# Patient Record
Sex: FEMALE | Race: WHITE | NOT HISPANIC OR LATINO | URBAN - METROPOLITAN AREA
[De-identification: names, ages, dates, MRNs, and addresses within clinical notes are randomized per-mention and may not be internally consistent; named-entity substitution may affect disease eponyms.]

---

## 2019-07-03 ENCOUNTER — OFFICE VISIT (OUTPATIENT)
Dept: PRIMARY CARE | Facility: CLINIC | Age: 24
End: 2019-07-03
Payer: COMMERCIAL

## 2019-07-03 VITALS
TEMPERATURE: 98.9 F | DIASTOLIC BLOOD PRESSURE: 64 MMHG | BODY MASS INDEX: 17.75 KG/M2 | RESPIRATION RATE: 12 BRPM | HEART RATE: 68 BPM | WEIGHT: 104 LBS | OXYGEN SATURATION: 98 % | SYSTOLIC BLOOD PRESSURE: 102 MMHG | HEIGHT: 64 IN

## 2019-07-03 DIAGNOSIS — Z00.00 PHYSICAL EXAM: Primary | ICD-10-CM

## 2019-07-03 PROCEDURE — 99385 PREV VISIT NEW AGE 18-39: CPT | Performed by: FAMILY MEDICINE

## 2019-07-03 ASSESSMENT — ENCOUNTER SYMPTOMS
BACK PAIN: 0
SLEEP DISTURBANCE: 0
DYSURIA: 0
CHEST TIGHTNESS: 0
BRUISES/BLEEDS EASILY: 0
TROUBLE SWALLOWING: 0
EYE PAIN: 0
TREMORS: 0
PALPITATIONS: 0
COUGH: 0
NECK PAIN: 0
SORE THROAT: 0
APPETITE CHANGE: 0
ARTHRALGIAS: 0
BLOOD IN STOOL: 0
FEVER: 0
FATIGUE: 0
SHORTNESS OF BREATH: 0
NERVOUS/ANXIOUS: 0
POLYPHAGIA: 0
SINUS PRESSURE: 0
SINUS PAIN: 0
VOICE CHANGE: 0
DYSPHORIC MOOD: 0
HEADACHES: 0
ACTIVITY CHANGE: 0
EYE DISCHARGE: 0
WHEEZING: 0
ADENOPATHY: 0
CHILLS: 0
WEAKNESS: 0
CONFUSION: 0
JOINT SWELLING: 0
POLYDIPSIA: 0

## 2019-07-03 NOTE — PROGRESS NOTES
Patient ID: Lakshmi Melendez is a 23 y.o. female.    Subjective     HPIplans  Graduate nurse study   No    Migraine in  1.5 years       Chief Complaint   Patient presents with   • Establish care       There is no problem list on file for this patient.      History reviewed. No pertinent past medical history.    History reviewed. No pertinent surgical history.    Family History   Problem Relation Age of Onset   • Osteoporosis Mother    • Heart attack Maternal Grandmother    • Stroke Maternal Grandmother    • Heart attack Maternal Grandfather    • Skin cancer Paternal Grandfather        Social History     Social History   • Marital status: Single     Spouse name: N/A   • Number of children: N/A   • Years of education: N/A     Social History Main Topics   • Smoking status: Never Smoker   • Smokeless tobacco: Never Used   • Alcohol use Yes      Comment: Socially   • Drug use: No   • Sexual activity: No     Other Topics Concern   • None     Social History Narrative    RN       No current outpatient prescriptions on file.     No current facility-administered medications for this visit.        Alllergies   Patient has no known allergies.      The following have been reviewed and updated as appropriate in this visit:       Review of Systems   Constitutional: Negative for activity change, appetite change, chills, fatigue and fever.   HENT: Negative for congestion, ear pain, hearing loss, postnasal drip, sinus pain, sinus pressure, sore throat, tinnitus, trouble swallowing and voice change.    Eyes: Negative for pain, discharge and visual disturbance.   Respiratory: Negative for cough, chest tightness, shortness of breath and wheezing.    Cardiovascular: Negative for chest pain, palpitations and leg swelling.   Gastrointestinal: Negative for blood in stool.   Endocrine: Negative for cold intolerance, heat intolerance, polydipsia and polyphagia.   Genitourinary: Negative for dysuria.   Musculoskeletal: Negative for  "arthralgias, back pain, joint swelling and neck pain.   Skin: Negative for rash.   Allergic/Immunologic: Negative for environmental allergies, food allergies and immunocompromised state.   Neurological: Negative for tremors, weakness and headaches.   Hematological: Negative for adenopathy. Does not bruise/bleed easily.   Psychiatric/Behavioral: Negative for confusion, dysphoric mood and sleep disturbance. The patient is not nervous/anxious.        Objective     Vitals:    07/03/19 1630   BP: 102/64   BP Location: Right upper arm   Patient Position: Sitting   Pulse: 68   Resp: 12   Temp: 37.2 °C (98.9 °F)   TempSrc: Oral   SpO2: 98%   Weight: 47.2 kg (104 lb)   Height: 1.613 m (5' 3.5\")     Body mass index is 18.13 kg/m².    Physical Exam   Constitutional: She is oriented to person, place, and time. She appears well-developed and well-nourished.   HENT:   Head: Normocephalic and atraumatic.   Right Ear: External ear normal.   Left Ear: External ear normal.   Nose: Nose normal.   Mouth/Throat: Oropharynx is clear and moist.   Eyes: Pupils are equal, round, and reactive to light. Conjunctivae and EOM are normal. No scleral icterus.   Neck: No tracheal deviation present.   Cardiovascular: Normal rate, regular rhythm, normal heart sounds and intact distal pulses.  Exam reveals no gallop and no friction rub.    No murmur heard.  Pulmonary/Chest: Effort normal and breath sounds normal. No respiratory distress. She has no wheezes. She has no rales.   Abdominal: Soft. Bowel sounds are normal. She exhibits no distension and no mass. There is no tenderness. There is no rebound and no guarding. No hernia.   Musculoskeletal: Normal range of motion. She exhibits no edema, tenderness or deformity.   Lymphadenopathy:     She has no cervical adenopathy.   Neurological: She is alert and oriented to person, place, and time. No cranial nerve deficit.   Skin: No rash noted. No erythema.   Psychiatric: She has a normal mood and affect. " Her behavior is normal. Thought content normal.       Assessment/Plan   Problem List Items Addressed This Visit     None      Visit Diagnoses     Physical exam    -  Primary         well exam

## 2019-07-03 NOTE — LETTER
July 3, 2019     Patient: Lakshmi Melendez  YOB: 1995  Date of Visit: 7/3/2019    To Whom it May Concern:    Lakshmi Melendez was seen in my clinic on 7/3/2019 at 4:20 pm.  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    If you have any questions or concerns, please don't hesitate to call.         Sincerely,         Christine Patel MD        CC: No Recipients

## 2019-09-11 ENCOUNTER — HOSPITAL ENCOUNTER (OUTPATIENT)
Facility: CLINIC | Age: 24
Discharge: HOME | End: 2019-09-11
Attending: FAMILY MEDICINE
Payer: COMMERCIAL

## 2019-09-11 VITALS
WEIGHT: 109.2 LBS | BODY MASS INDEX: 18.64 KG/M2 | SYSTOLIC BLOOD PRESSURE: 100 MMHG | TEMPERATURE: 98.4 F | DIASTOLIC BLOOD PRESSURE: 58 MMHG | HEART RATE: 90 BPM | OXYGEN SATURATION: 97 % | HEIGHT: 64 IN

## 2019-09-11 DIAGNOSIS — H10.32 ACUTE CONJUNCTIVITIS OF LEFT EYE, UNSPECIFIED ACUTE CONJUNCTIVITIS TYPE: Primary | ICD-10-CM

## 2019-09-11 PROCEDURE — 99213 OFFICE O/P EST LOW 20 MIN: CPT | Performed by: FAMILY MEDICINE

## 2019-09-11 PROCEDURE — S9083 URGENT CARE CENTER GLOBAL: HCPCS | Performed by: FAMILY MEDICINE

## 2019-09-11 RX ORDER — POLYMYXIN B SULFATE AND TRIMETHOPRIM 1; 10000 MG/ML; [USP'U]/ML
1 SOLUTION OPHTHALMIC
Qty: 10 ML | Refills: 0 | Status: SHIPPED | OUTPATIENT
Start: 2019-09-11 | End: 2019-09-21

## 2019-09-11 ASSESSMENT — ENCOUNTER SYMPTOMS
NUMBNESS: 0
CHILLS: 0
FATIGUE: 0
FEVER: 0
WEAKNESS: 0
DIAPHORESIS: 0

## 2019-09-11 NOTE — ED PROVIDER NOTES
History  Chief Complaint   Patient presents with   • Conjunctivitis     Fourth day of mild redness of the left eye diffusely, and thick yellowish discharge reaccumulating at least in the morning, less so during the day.  No known exposures or trauma.  No change in visual acuity.  No headache.  Suspects pinkeye.  Does not have any allergies or symptoms in the right eye.            No past medical history on file.    No past surgical history on file.    Family History   Problem Relation Age of Onset   • Osteoporosis Mother    • Heart attack Maternal Grandmother    • Stroke Maternal Grandmother    • Heart attack Maternal Grandfather    • Skin cancer Paternal Grandfather        Social History   Substance Use Topics   • Smoking status: Never Smoker   • Smokeless tobacco: Never Used   • Alcohol use Yes      Comment: Socially       Review of Systems   Constitutional: Negative for chills, diaphoresis, fatigue and fever.   Neurological: Negative for weakness and numbness.       Physical Exam  ED Triage Vitals [09/11/19 1808]   Temp Heart Rate Resp BP SpO2   36.9 °C (98.4 °F) 90 -- (!) 100/58 97 %      Temp Source Heart Rate Source Patient Position BP Location FiO2 (%) (Set)   Oral -- Sitting Left upper arm --       Physical Exam   Constitutional: She appears well-developed and well-nourished. No distress.   HENT:   Head: Normocephalic and atraumatic.   Eyes: Pupils are equal, round, and reactive to light. EOM and lids are normal. Lids are everted and swept, no foreign bodies found. Left eye exhibits no chemosis, no discharge (None currently apparent) and no hordeolum. No foreign body present in the left eye. Right conjunctiva is not injected. Right conjunctiva has no hemorrhage. Left conjunctiva is injected (Diffuse, mild). Left conjunctiva has no hemorrhage.   Pulmonary/Chest: Effort normal.   Neurological: She is alert.   Skin: No rash noted. She is not diaphoretic.   Psychiatric: She has a normal mood and affect.          Procedures  Procedures    UC Course  Clinical Impressions as of Sep 11 1822   Acute conjunctivitis of left eye, unspecified acute conjunctivitis type       MDM  Number of Diagnoses or Management Options  Acute conjunctivitis of left eye, unspecified acute conjunctivitis type:   Diagnosis management comments: Acute conjunctivitis of left eye, unspecified acute conjunctivitis type  (primary encounter diagnosis)  Polytrim.                  Travon Lay MD  09/11/19 1824

## 2019-09-11 NOTE — DISCHARGE INSTRUCTIONS
Use the eye antibiotic prescribed.    Practice good hand hygiene--  avoid touching affected eye.   No use of contact lenses until symptoms completely resolve.  If at any time symptoms worsen (e.g. upper eyelid also swollen, change in vision, fever, pain with eye movement, etc.), go to ER or an Eye Urgent Care (e.g. [facility]--- [address] [place] [id number]--  [address] from the [facility] main entrance; Phone: [phone]).

## 2020-01-09 ENCOUNTER — TELEPHONE (OUTPATIENT)
Dept: PRIMARY CARE | Facility: CLINIC | Age: 25
End: 2020-01-09

## 2020-03-09 ENCOUNTER — OFFICE VISIT (OUTPATIENT)
Dept: PRIMARY CARE | Facility: CLINIC | Age: 25
End: 2020-03-09
Payer: COMMERCIAL

## 2020-03-09 VITALS
WEIGHT: 113.8 LBS | SYSTOLIC BLOOD PRESSURE: 100 MMHG | DIASTOLIC BLOOD PRESSURE: 70 MMHG | HEART RATE: 81 BPM | BODY MASS INDEX: 18.96 KG/M2 | OXYGEN SATURATION: 96 % | TEMPERATURE: 97.9 F | HEIGHT: 65 IN

## 2020-03-09 DIAGNOSIS — Z00.00 PHYSICAL EXAM: Primary | ICD-10-CM

## 2020-03-09 PROCEDURE — 99395 PREV VISIT EST AGE 18-39: CPT | Performed by: FAMILY MEDICINE

## 2020-03-09 ASSESSMENT — ENCOUNTER SYMPTOMS
JOINT SWELLING: 0
COUGH: 0
ABDOMINAL PAIN: 0
WHEEZING: 0
POLYDIPSIA: 0
AGITATION: 0
UNEXPECTED WEIGHT CHANGE: 0
HEADACHES: 0
SHORTNESS OF BREATH: 0
NECK PAIN: 0
ACTIVITY CHANGE: 0
BLOOD IN STOOL: 0
CHILLS: 0
FATIGUE: 0
APPETITE CHANGE: 0
TROUBLE SWALLOWING: 0
NUMBNESS: 0
NERVOUS/ANXIOUS: 0
DIFFICULTY URINATING: 0
PALPITATIONS: 0
SORE THROAT: 0
BRUISES/BLEEDS EASILY: 0
SINUS PAIN: 0
FEVER: 0
EYE PAIN: 0
CHEST TIGHTNESS: 0
ADENOPATHY: 0
BACK PAIN: 0
SLEEP DISTURBANCE: 0
ARTHRALGIAS: 0

## 2020-03-09 NOTE — PROGRESS NOTES
Patient ID: Lakshmi Melendez is a 24 y.o. female.    Subjective     HPI     Never had a pap    usually get at  Age  20   Advise  gardisil  Vaccine    Suspects  nona Cantu     teachtisha    Nursing and  doctoral   Program  [place]    Chief Complaint   Patient presents with   • Annual Exam       There is no problem list on file for this patient.      No past medical history on file.    No past surgical history on file.    Family History   Problem Relation Age of Onset   • Osteoporosis Biological Mother    • Heart attack Maternal Grandmother    • Stroke Maternal Grandmother    • Heart attack Maternal Grandfather    • Skin cancer Paternal Grandfather        Social History     Socioeconomic History   • Marital status: Single     Spouse name: None   • Number of children: None   • Years of education: None   • Highest education level: None   Occupational History   • None   Social Needs   • Financial resource strain: None   • Food insecurity:     Worry: None     Inability: None   • Transportation needs:     Medical: None     Non-medical: None   Tobacco Use   • Smoking status: Never Smoker   • Smokeless tobacco: Never Used   Substance and Sexual Activity   • Alcohol use: Yes     Comment: Socially   • Drug use: No   • Sexual activity: Never   Lifestyle   • Physical activity:     Days per week: None     Minutes per session: None   • Stress: None   Relationships   • Social connections:     Talks on phone: None     Gets together: None     Attends [employer] service: None     Active member of club or organization: None     Attends meetings of clubs or organizations: None     Relationship status: None   • Intimate partner violence:     Fear of current or ex partner: None     Emotionally abused: None     Physically abused: None     Forced sexual activity: None   Other Topics Concern   • None   Social History Narrative    RN       Current Outpatient Medications   Medication Sig Dispense Refill   • rizatriptan MLT (MAXALT-MLT) 10  "mg disintegrating tablet Take 1 tablet (10 mg total) by mouth once as needed for migraine. 9 tablet 1     No current facility-administered medications for this visit.        Alllergies   Patient has no known allergies.      The following have been reviewed and updated as appropriate in this visit:       Review of Systems   Constitutional: Negative for activity change, appetite change, chills, fatigue, fever and unexpected weight change.   HENT: Negative for congestion, ear pain, mouth sores, sinus pain, sore throat and trouble swallowing.    Eyes: Negative for pain and visual disturbance (no change in vision).        No Change in vision    Respiratory: Negative for cough, chest tightness, shortness of breath and wheezing.    Cardiovascular: Negative for chest pain, palpitations and leg swelling.   Gastrointestinal: Negative for abdominal pain and blood in stool.   Endocrine: Negative for cold intolerance, heat intolerance, polydipsia and polyuria.   Genitourinary: Negative for difficulty urinating.   Musculoskeletal: Negative for arthralgias, back pain, joint swelling and neck pain.   Skin: Negative for rash.   Allergic/Immunologic: Negative for environmental allergies, food allergies and immunocompromised state.   Neurological: Negative for syncope, numbness and headaches.   Hematological: Negative for adenopathy. Does not bruise/bleed easily.   Psychiatric/Behavioral: Negative for agitation, behavioral problems and sleep disturbance. The patient is not nervous/anxious.        Objective     Vitals:    03/09/20 1415   BP: 100/70   BP Location: Left upper arm   Patient Position: Sitting   Pulse: 81   Temp: 36.6 °C (97.9 °F)   TempSrc: Oral   SpO2: 96%   Weight: 51.6 kg (113 lb 12.8 oz)   Height: 1.651 m (5' 5\")     Body mass index is 18.94 kg/m².    Physical Exam   Constitutional: She is oriented to person, place, and time. She appears well-developed and well-nourished.   HENT:   Head: Normocephalic and atraumatic. "   Right Ear: External ear normal.   Left Ear: External ear normal.   Nose: Nose normal.   Mouth/Throat: Oropharynx is clear and moist.   Eyes: Pupils are equal, round, and reactive to light. Conjunctivae and EOM are normal. No scleral icterus.   Neck: No tracheal deviation present.   Cardiovascular: Normal rate, regular rhythm, normal heart sounds and intact distal pulses. Exam reveals no gallop and no friction rub.   No murmur heard.  Pulmonary/Chest: Effort normal and breath sounds normal. No respiratory distress. She has no wheezes. She has no rales.   Abdominal: Soft. Bowel sounds are normal. She exhibits no distension and no mass. There is no tenderness. There is no rebound and no guarding. No hernia.   Musculoskeletal: Normal range of motion. She exhibits no edema, tenderness or deformity.   Lymphadenopathy:     She has no cervical adenopathy.   Neurological: She is alert and oriented to person, place, and time. No cranial nerve deficit.   Skin: No rash noted. No erythema.   Psychiatric: She has a normal mood and affect. Her behavior is normal. Thought content normal.    L  scapula  Small  sub q cyst  ( surgery only if  growing in size  )  1cm round and movable   Assessment/Plan   Problem List Items Addressed This Visit     None      Visit Diagnoses     Physical exam    -  Primary         well exam  Re think  gardisill  Vaccine and pap ,  surgeion if cust on  scpula increases    No hernia

## 2020-05-20 ENCOUNTER — TELEMEDICINE (OUTPATIENT)
Dept: PRIMARY CARE | Facility: CLINIC | Age: 25
End: 2020-05-20
Payer: COMMERCIAL

## 2020-05-20 DIAGNOSIS — Z20.822 CLOSE EXPOSURE TO COVID-19 VIRUS: Primary | ICD-10-CM

## 2020-05-20 PROCEDURE — 99213 OFFICE O/P EST LOW 20 MIN: CPT | Mod: 95,CS | Performed by: FAMILY MEDICINE

## 2020-05-20 NOTE — PROGRESS NOTES
Verification of Patient Location:  The patient affirms they are currently located in the following state: [place]    Request for Consent:   Video Encounter   Kristie, my name is Christine Patel MD.  Before we proceed, can you please verify your identification by telling me your full name and date of birth?  Can you tell me who is in the room with you?    You and I are about to have a telemedicine check-in or visit because you have requested it.  This is a live video-conference.  I am a real person, speaking to you in real time.  There is no one else with me on the video-conference.  However, when we use ([employer], [employer], etc) it is important for you to know that the video-conference may not be secure or private.  I am not recording this conversation and I am asking you not to record it.  This telemedicine visit will be billed to your health insurance or you, if you are self-insured.  You understand you will be responsible for any copayments or coinsurances that apply to your telemedicine visit.  Before starting our telemedicine visit, I am required to get your consent for this virtual check-in or visit by telemedicine. Do you consent?    Patient Response to Request for Consent:  Yes      Visit Documentation:  Subjective     Patient ID: Lakshmi Melendez is a 24 y.o. female.  1995      HPI worked  in  [facility] for   igm negative and  igg is postive    Might have   Had  the virus  But was asymtomatic  Mid march .in [place] and  Early march [place]  Work exposure  To  covid  19  Both places.     Test not that accurate .  Wants  the  Serum not just the  Finger stick e      The following have been reviewed and updated as appropriate in this visit:       Review of Systems no  Fever no  Chills   No  Couch       Assessment/Plan   Diagnoses and all orders for this visit:    Close exposure to COVID-19 virus (Primary)  -     COVID-19 SARS-COV-2 ANTIBODY (IGM); Future  -     Covid-19 SARS CoV-2 Antibody  (IgG); Future        Time Spent in Medical Discussion During This Encounter:      10 minutes

## 2020-05-22 LAB
SARS-COV-2 IGG SERPL QL IA: NEGATIVE
SARS-COV-2 IGG SERPL QL IA: NORMAL
SARS-COV-2 IGM SERPL QL IA: NEGATIVE

## 2020-06-15 ENCOUNTER — CLINICAL SUPPORT (OUTPATIENT)
Dept: PRIMARY CARE | Facility: CLINIC | Age: 25
End: 2020-06-15
Payer: COMMERCIAL

## 2020-06-15 PROCEDURE — 90651 9VHPV VACCINE 2/3 DOSE IM: CPT | Performed by: FAMILY MEDICINE

## 2020-06-15 PROCEDURE — 90471 IMMUNIZATION ADMIN: CPT | Performed by: FAMILY MEDICINE

## 2020-07-21 ENCOUNTER — PATIENT OUTREACH (OUTPATIENT)
Dept: PRIMARY CARE | Facility: CLINIC | Age: 25
End: 2020-07-21

## 2020-07-21 NOTE — PROGRESS NOTES
Care Gap Team has outreached to Lakshmi Melendez on behalf of their primary care provider.      Care Gap Source:: [facility]         Care Gap Status:: Due    Outreach via:: Telephone    Adult Preventive Wellness Protocol(s) Used: : Cervical Cancer Screening    Chart Review Completed:: Yes  Patient interview completed:: No  Inclusion Criteria:: Met  Exclusion Criteria: None  Patient educated on recommended care:: No                 Appointment provided:: No       Called and left VM for patient letting them know that they are due for their pap..  Asked patient to return my call.

## 2020-08-06 ENCOUNTER — TELEPHONE (OUTPATIENT)
Dept: PRIMARY CARE | Facility: CLINIC | Age: 25
End: 2020-08-06

## 2020-08-06 NOTE — TELEPHONE ENCOUNTER
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 The note needs to state that she \"has been seen within the current year and that she is physically fit for clinical rotation.\"  It also needs to be printed out and actually hand signed by Dr. Patel. Please notify when complete and mail hard copy.    Also, she had 1st HPV shot a couple months ago and wants to know when she is supposed to come in for her next one.   "

## 2020-08-06 NOTE — LETTER
August 9, 2020     Patient: Lakshmi Melendez   YOB: [birth year]       To Whom it May Concern:    Lakshmi Melendez was seen in my clinic  In March .  Had a well complete physical examination.  She has no restrictions to participate in clinicals    If you have any questions or concerns, please don't hesitate to call.         Sincerely,          Christine Patel MD        CC: No Recipients

## 2020-08-09 NOTE — TELEPHONE ENCOUNTER
Was created for patient, it should be in her portal, but call her and offer to send it it I think I forgot to put my signature on it so give it to me to sign

## 2020-08-19 ENCOUNTER — TELEPHONE (OUTPATIENT)
Dept: PRIMARY CARE | Facility: CLINIC | Age: 25
End: 2020-08-19

## 2020-12-09 ENCOUNTER — TELEMEDICINE (OUTPATIENT)
Dept: PRIMARY CARE | Facility: CLINIC | Age: 25
End: 2020-12-09
Payer: COMMERCIAL

## 2020-12-09 DIAGNOSIS — U07.1 2019 NOVEL CORONAVIRUS DISEASE (COVID-19): Primary | ICD-10-CM

## 2020-12-09 PROCEDURE — 99213 OFFICE O/P EST LOW 20 MIN: CPT | Mod: 95,CS | Performed by: FAMILY MEDICINE

## 2020-12-09 NOTE — LETTER
December 9, 2020     Patient: Lakshmi Melendez  YOB: 1995  Date of Visit: 12/9/2020    To Whom it May Concern:    Lakshmi Melendez was seen in my clinic on 12/9/2020 at 5:15 pm. Please excuse Lakshmi for her absence from work on this day to make the appointment.    If you have any questions or concerns, please don't hesitate to call.         Sincerely,         Christine Patel MD        CC: No Recipients

## 2020-12-09 NOTE — PROGRESS NOTES
Verification of Patient Location:  The patient affirms they are currently located in the following state: [place]    Request for Consent:    Video Encounter   Kristie, my name is Christine Patel MD.  Before we proceed, can you please verify your identification by telling me your full name and date of birth?  Can you tell me who is in the room with you?    You and I are about to have a telemedicine check-in or visit because you have requested it.  This is a live video-conference.  I am a real person, speaking to you in real time.  There is no one else with me on the video-conference.  However, when we use ([employer], [employer], etc) it is important for you to know that the video-conference may not be secure or private.  I am not recording this conversation and I am asking you not to record it.  This telemedicine visit will be billed to your health insurance or you, if you are self-insured.  You understand you will be responsible for any copayments or coinsurances that apply to your telemedicine visit.  Communication platform used for this encounter:  [employer]     Before starting our telemedicine visit, I am required to get your consent for this virtual check-in or visit by telemedicine. Do you consent?      Patient Response to Request for Consent:  Yes      Visit Documentation:  Subjective     Patient ID: Lakshmi Melendez is a 24 y.o. female.  1995      HPI  Got  covid and needs  Note  . It  Has been   10 day   Sunday befor least   Temp  Was  Elevated  Lost  Sense of smell    On  The  Dec   2  This persists.     Test was  11/ 30   Employer  Had  Rapid  Test informally  [facility]  Then  Antigen .   Had  Second test  And  also + Dec 2nd .     The following have been reviewed and updated as appropriate in this visit:  Tobacco  Allergies  Meds  Med Hx  Surg Hx  Fam Hx  Soc Hx      Review of Systems  No fever chills  No  Cp  Feels  Well       Assessment/Plan   Diagnoses and all  orders for this visit:    2019 novel coronavirus disease (COVID-19) (Primary)        Time Spent in Medical Discussion During This Encounter:     15 minutes

## 2020-12-09 NOTE — LETTER
December 9, 2020     Patient: Lakshmi Melendez  YOB: 1995  Date of Visit: 12/9/2020    To Whom it May Concern:    Lakshmi Melendez was seen and evaluated  On  12/9/2020 at 5:15 pm. Please excuse Lakshmi for her absence from work starting  Nov 30 th . She is cleared to  return to work .    If you have any questions or concerns, please don't hesitate to call.         Sincerely,         Christine Patel MD        CC: No Recipients

## 2020-12-30 ENCOUNTER — TELEPHONE (OUTPATIENT)
Dept: PRIMARY CARE | Facility: CLINIC | Age: 25
End: 2020-12-30

## 2020-12-30 DIAGNOSIS — Z23 IMMUNIZATION DUE: Primary | ICD-10-CM

## 2020-12-30 NOTE — TELEPHONE ENCOUNTER
She should have been here earlier as HPV given at month 0, then 1-2 months later, then 6 months after the first. So she can come in for the 2nd. Then gets 3rd dose 12 weeks after the 2nd dose for a total of 3 doses  I will order.

## 2020-12-30 NOTE — TELEPHONE ENCOUNTER
Pt called and wanted to schedule her second HPV shot. Pt had one in June of this year. Pt's last office visit was on 3/09/20. I just want to make sure it is ok to schedule before I call pt and schedule it. Please advise

## 2020-12-31 ENCOUNTER — CLINICAL SUPPORT (OUTPATIENT)
Dept: PRIMARY CARE | Facility: CLINIC | Age: 25
End: 2020-12-31
Payer: COMMERCIAL

## 2020-12-31 ENCOUNTER — TELEPHONE (OUTPATIENT)
Dept: PRIMARY CARE | Facility: CLINIC | Age: 25
End: 2020-12-31

## 2020-12-31 PROCEDURE — 90471 IMMUNIZATION ADMIN: CPT | Performed by: INTERNAL MEDICINE

## 2020-12-31 PROCEDURE — 90651 9VHPV VACCINE 2/3 DOSE IM: CPT | Performed by: INTERNAL MEDICINE

## 2020-12-31 NOTE — TELEPHONE ENCOUNTER
Patient is coming in today at 1:30pm for her 2nd HPV vaccine, order needed. Put on schedule last minute because she needed it before end of the year.

## 2021-05-25 ENCOUNTER — HOSPITAL ENCOUNTER (OUTPATIENT)
Facility: CLINIC | Age: 26
End: 2021-05-25
Payer: COMMERCIAL

## 2022-09-16 ENCOUNTER — OFFICE VISIT (OUTPATIENT)
Dept: PRIMARY CARE | Facility: CLINIC | Age: 27
End: 2022-09-16
Payer: COMMERCIAL

## 2022-09-16 VITALS
OXYGEN SATURATION: 98 % | HEIGHT: 64 IN | RESPIRATION RATE: 16 BRPM | WEIGHT: 111 LBS | TEMPERATURE: 98.7 F | SYSTOLIC BLOOD PRESSURE: 122 MMHG | DIASTOLIC BLOOD PRESSURE: 62 MMHG | HEART RATE: 85 BPM | BODY MASS INDEX: 18.95 KG/M2

## 2022-09-16 DIAGNOSIS — Z76.89 ENCOUNTER TO ESTABLISH CARE WITH NEW DOCTOR: ICD-10-CM

## 2022-09-16 DIAGNOSIS — Z00.00 ANNUAL PHYSICAL EXAM: Primary | ICD-10-CM

## 2022-09-16 DIAGNOSIS — Z11.59 NEED FOR HEPATITIS C SCREENING TEST: ICD-10-CM

## 2022-09-16 DIAGNOSIS — Z11.4 ENCOUNTER FOR SCREENING FOR HIV: ICD-10-CM

## 2022-09-16 DIAGNOSIS — Z00.00 ROUTINE ADULT HEALTH MAINTENANCE: ICD-10-CM

## 2022-09-16 PROBLEM — G44.89 OTHER HEADACHE SYNDROME: Status: ACTIVE | Noted: 2022-09-16

## 2022-09-16 PROCEDURE — 99395 PREV VISIT EST AGE 18-39: CPT | Performed by: STUDENT IN AN ORGANIZED HEALTH CARE EDUCATION/TRAINING PROGRAM

## 2022-09-16 PROCEDURE — 3008F BODY MASS INDEX DOCD: CPT | Performed by: STUDENT IN AN ORGANIZED HEALTH CARE EDUCATION/TRAINING PROGRAM

## 2022-09-16 RX ORDER — ASCORBIC ACID 125 MG
TABLET,CHEWABLE ORAL SEE ADMIN INSTRUCTIONS
COMMUNITY
End: 2025-03-24

## 2022-09-16 RX ORDER — NICOTINE POLACRILEX 2 MG
GUM BUCCAL SEE ADMIN INSTRUCTIONS
COMMUNITY
End: 2025-03-24

## 2022-09-16 ASSESSMENT — ENCOUNTER SYMPTOMS
BACK PAIN: 0
DIZZINESS: 0
JOINT SWELLING: 0
NERVOUS/ANXIOUS: 0
ABDOMINAL PAIN: 0
HALLUCINATIONS: 0
FREQUENCY: 0
SHORTNESS OF BREATH: 0
DYSPHORIC MOOD: 0
HEADACHES: 0
NUMBNESS: 0
WHEEZING: 0
CHILLS: 0
PALPITATIONS: 0
EYE PAIN: 0
TROUBLE SWALLOWING: 0
VOICE CHANGE: 0
VOMITING: 0
COLOR CHANGE: 0
SINUS PAIN: 0
EYE DISCHARGE: 0
COUGH: 0
NECK PAIN: 0
FLANK PAIN: 0
CONSTIPATION: 0
DIARRHEA: 0
HEMATURIA: 0
LIGHT-HEADEDNESS: 0
POLYDIPSIA: 0
EYE REDNESS: 0
SORE THROAT: 0
SINUS PRESSURE: 0
FATIGUE: 0
APPETITE CHANGE: 0
ADENOPATHY: 0
DIFFICULTY URINATING: 0
RHINORRHEA: 0
WEAKNESS: 0
SLEEP DISTURBANCE: 0
BLOOD IN STOOL: 0
NAUSEA: 0
CHEST TIGHTNESS: 0
FEVER: 0
ARTHRALGIAS: 0
DYSURIA: 0
CONFUSION: 0

## 2022-09-16 NOTE — ASSESSMENT & PLAN NOTE
Unclear if true Migraine vs Tension HA. Mild and infrequent. Primary management with NSAIDs, rest.   - d/c migraine abortive. Does not provide benefit when sxs occur

## 2022-09-16 NOTE — PROGRESS NOTES
[facility] at [facility]       Subjective     Establish Care (Pt present to establish care with pcp. No issues or changes, states things are good )        Patient ID: Lakshmi Melendez is a 26 y.o. female presenting today for establish care with new provider.    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 Works mostly inpatient but has clinic hours once per week. Due for physical    Lives in [place] currently but wants to move closer to the city.     #HM  Immunizations: Flu - gets done at work. Covid x 2, would like to get bivalent booster when available. Tdap UTD. Had HPV x2, unsure if new ins would cover 3rd dose now that older than 27  Pap: completed last year in [place], pt reports was normal    Smoker?: Never. No vaping  Sexual health: Male partners exclusively. Not currently sexually active  Contraception: None currently. Not interested at this time    HIV screening: due  HCV screening: due    Dentist: New dental insurance through new job, needs to make appt    #Migraines   - Once or twice yearly. Seems situational  - Rizatriptan has not helped in the past. Usually improves with Advil and rest      The following have been reviewed and updated as appropriate in this visit:   Allergies  Meds  Problems         Review of Systems   Constitutional: Negative for appetite change, chills, fatigue and fever.   HENT: Negative for congestion, ear pain, hearing loss, postnasal drip, rhinorrhea, sinus pressure, sinus pain, sore throat, trouble swallowing and voice change.    Eyes: Negative for pain, discharge, redness and visual disturbance.   Respiratory: Negative for cough, chest tightness, shortness of breath and wheezing.    Cardiovascular: Negative for chest pain, palpitations and leg swelling.   Gastrointestinal: Negative for abdominal pain, blood in stool, constipation, diarrhea, nausea and vomiting.   Endocrine: Negative for polydipsia and  polyuria.   Genitourinary: Negative for difficulty urinating, dysuria, flank pain, frequency, hematuria and urgency.   Musculoskeletal: Negative for arthralgias, back pain, gait problem, joint swelling and neck pain.   Skin: Negative for color change and rash.   Neurological: Negative for dizziness, syncope, weakness, light-headedness, numbness and headaches.   Hematological: Negative for adenopathy.   Psychiatric/Behavioral: Negative for confusion, dysphoric mood, hallucinations, sleep disturbance and suicidal ideas. The patient is not nervous/anxious.      Current Outpatient Medications   Medication Sig Dispense Refill    biotin 1 mg capsule Take by mouth See admin instr.      cholecalciferol, vitamin D3, 25 mcg (1,000 unit) tablet,chewable Take by mouth See admin instr.      flaxseed oiL oil by Other route.       No current facility-administered medications for this visit.     Past Medical History:   Diagnosis Date    Migraine      Family History   Problem Relation Age of Onset    Osteoporosis Biological Mother     Heart attack Maternal Grandmother     Stroke Maternal Grandmother     Heart attack Maternal Grandfather     Skin cancer Paternal Grandfather      No Known Allergies  History reviewed. No pertinent surgical history.  Social History     Socioeconomic History    Marital status: Single     Spouse name: None    Number of children: 0    Years of education: None    Highest education level: None   Occupational History    Occupation: NP   Tobacco Use    Smoking status: Never Smoker    Smokeless tobacco: Never Used   Vaping Use    Vaping Use: Never used   Substance and Sexual Activity    Alcohol use: Yes     Comment: Socially    Drug use: No    Sexual activity: Not Currently     Partners: Male   Social History Narrative    PT feels safe at home        Objective     Vitals:   Vitals:    09/16/22 1124   BP: 122/62   Pulse: 85   Resp: 16   Temp: 37.1 °C (98.7 °F)   SpO2: 98%   Weight: 50.3 kg  "(111 lb)   Height: 1.63 m (5' 4.17\")       Physical Exam  Vitals reviewed.   Constitutional:       General: She is not in acute distress.     Appearance: She is well-developed and underweight. She is not ill-appearing.   HENT:      Head: Normocephalic and atraumatic.      Right Ear: External ear normal.      Left Ear: External ear normal.      Nose: Nose normal.   Eyes:      General: No scleral icterus.     Extraocular Movements: Extraocular movements intact.      Conjunctiva/sclera: Conjunctivae normal.      Pupils: Pupils are equal, round, and reactive to light.   Neck:      Thyroid: No thyromegaly.   Cardiovascular:      Rate and Rhythm: Normal rate and regular rhythm.      Heart sounds: Normal heart sounds. No murmur heard.  Pulmonary:      Effort: Pulmonary effort is normal. No respiratory distress.      Breath sounds: Normal breath sounds. No wheezing, rhonchi or rales.   Abdominal:      General: Bowel sounds are normal. There is no distension.      Palpations: Abdomen is soft.      Tenderness: There is no abdominal tenderness. There is no right CVA tenderness, left CVA tenderness, guarding or rebound.   Musculoskeletal:         General: No tenderness or deformity. Normal range of motion.      Cervical back: Normal range of motion and neck supple.      Right lower leg: No edema.      Left lower leg: No edema.   Lymphadenopathy:      Cervical: No cervical adenopathy.   Skin:     General: Skin is warm and dry.      Capillary Refill: Capillary refill takes less than 2 seconds.      Findings: No rash.   Neurological:      General: No focal deficit present.      Mental Status: She is alert and oriented to person, place, and time.      Motor: No abnormal muscle tone.      Deep Tendon Reflexes: Reflexes normal.   Psychiatric:         Behavior: Behavior normal.         Labs  No results found for: WBC, HGB, HCT, PLT, CHOL, TRIG, HDL, LDLCALC, ALT, AST, NA, K, GLUCOSE, CL, CREATININE, BUN, CO2, TSH, T4F, HGBA1C, " MCRALBCREAT, EGFR, PSA    Assessment/Plan     Diagnoses and all orders for this visit:    Annual physical exam (Primary)  Comments:  Healthy adult. A few big life changes upcoming: Starting new job at [employer] as NP on trauma team, likely moving  Orders:  -     Basic metabolic panel; Future  -     CBC and differential; Future    Encounter to establish care with new doctor    Routine adult health maintenance  Assessment & Plan:  Next Pap in 2024. Plans to get Flu vax at work, covid booster when available. Low risk sexual activity. Declines contraception at this time. HIV, HCV screenings ordered  - Pt will check if insurance will cover 3rd and final Gardasil dose now that she is > 27 yo. If so, will plan to administer. Could also pay OOP if desires  - Borderline low-normal BMI      Encounter for screening for HIV  -     HIV 1,2 AB P24 AG; Future    Need for hepatitis C screening test  -     Hepatitis C antibody; Future      The patient, Lakshmi Melendez, verbalizes understanding and agreement with the above plan as well as reasons for seeking more immediate follow-up care.     Tree Enamorado MD

## 2022-09-16 NOTE — ASSESSMENT & PLAN NOTE
Next Pap in 2024. Plans to get Flu vax at work, covid booster when available. Low risk sexual activity. Declines contraception at this time. HIV, HCV screenings ordered  - Pt will check if insurance will cover 3rd and final Gardasil dose now that she is > 25 yo. If so, will plan to administer. Could also pay OOP if desires  - Borderline low-normal BMI

## 2025-03-24 ENCOUNTER — OFFICE VISIT (OUTPATIENT)
Dept: PRIMARY CARE | Facility: CLINIC | Age: 30
End: 2025-03-24
Payer: COMMERCIAL

## 2025-03-24 VITALS
WEIGHT: 114 LBS | HEIGHT: 65 IN | TEMPERATURE: 97.9 F | HEART RATE: 80 BPM | RESPIRATION RATE: 16 BRPM | BODY MASS INDEX: 18.99 KG/M2 | DIASTOLIC BLOOD PRESSURE: 64 MMHG | OXYGEN SATURATION: 98 % | SYSTOLIC BLOOD PRESSURE: 102 MMHG

## 2025-03-24 DIAGNOSIS — Z00.00 ROUTINE ADULT HEALTH MAINTENANCE: Primary | ICD-10-CM

## 2025-03-24 DIAGNOSIS — G44.89 OTHER HEADACHE SYNDROME: ICD-10-CM

## 2025-03-24 DIAGNOSIS — Z11.59 NEED FOR HEPATITIS C SCREENING TEST: ICD-10-CM

## 2025-03-24 DIAGNOSIS — Z11.4 SCREENING FOR HIV (HUMAN IMMUNODEFICIENCY VIRUS): ICD-10-CM

## 2025-03-24 PROCEDURE — 3008F BODY MASS INDEX DOCD: CPT | Performed by: STUDENT IN AN ORGANIZED HEALTH CARE EDUCATION/TRAINING PROGRAM

## 2025-03-24 PROCEDURE — 99395 PREV VISIT EST AGE 18-39: CPT | Performed by: STUDENT IN AN ORGANIZED HEALTH CARE EDUCATION/TRAINING PROGRAM

## 2025-03-24 ASSESSMENT — PATIENT HEALTH QUESTIONNAIRE - PHQ9: SUM OF ALL RESPONSES TO PHQ9 QUESTIONS 1 & 2: 0

## 2025-03-24 NOTE — PROGRESS NOTES
[facility] at [facility]       Subjective     Annual Exam (No issues, problems or concerns. No forms for work or school. )        Patient ID: Lakshmi Melendez is a 29 y.o. female presenting today for EPP.      #HM  Immunizations: HPV#3  Pap: due  Mammogram: no Fhx breast cancer  [place]: Never. No Fhx CRC    Caffeine: cut back. 100-200mg daily  Smoking: Never smoker  EtOH: 1-2 drinks once weekly    Sexual health: No concerns  Contraception: None  LMP: last month. Regularly monthly  HIV screening: due  HCV screening: due    Dentist: due  Eyes: no concerns  Hearing: no concerns    Derm: mole on lower lip, present > 5 yrs without change    Diet: minimizes processed foods  Exercise: enjoys walking, light weight training      The following have been reviewed and updated as appropriate in this visit:   Allergies  Meds  Problems         No current outpatient medications on file.     No current facility-administered medications for this visit.     Past Medical History:   Diagnosis Date    Migraine      Family History   Problem Relation Name Age of Onset    Osteoporosis Biological Mother      Heart attack Maternal Grandmother      Stroke Maternal Grandmother      Heart attack Maternal Grandfather      Skin cancer Paternal Grandfather       No Known Allergies  No past surgical history on file.  Social History     Socioeconomic History    Marital status: Single     Spouse name: None    Number of children: 0    Years of education: None    Highest education level: None   Occupational History    Occupation: NP   Tobacco Use    Smoking status: Never    Smokeless tobacco: Never   Vaping Use    Vaping status: Never Used   Substance and Sexual Activity    Alcohol use: Yes     Comment: Socially    Drug use: No    Sexual activity: Not Currently     Partners: Male   Social History Narrative    PT feels safe at home        Objective     Vitals:   Vitals:    03/24/25 0802   BP: 102/64   Pulse: 80   Resp: 16   Temp: 36.6  "°C (97.9 °F)   SpO2: 98%   Weight: 51.7 kg (114 lb)   Height: 1.638 m (5' 4.5\")       Physical Exam  Vitals reviewed.   Constitutional:       General: She is not in acute distress.     Appearance: She is well-developed. She is not ill-appearing.      Comments: Thin body habitus   HENT:      Head: Normocephalic and atraumatic.      Right Ear: External ear normal.      Left Ear: External ear normal.      Nose: Nose normal.      Mouth/Throat:      Lips: Lesions present.      Mouth: No oral lesions.      Tongue: No lesions.      Palate: No lesions.     Eyes:      General: No scleral icterus.     Extraocular Movements: Extraocular movements intact.      Conjunctiva/sclera: Conjunctivae normal.      Pupils: Pupils are equal, round, and reactive to light.   Neck:      Thyroid: No thyroid mass, thyromegaly or thyroid tenderness.   Cardiovascular:      Rate and Rhythm: Normal rate and regular rhythm.      Heart sounds: Normal heart sounds. No murmur heard.  Pulmonary:      Effort: Pulmonary effort is normal. No respiratory distress.      Breath sounds: Normal breath sounds. No wheezing, rhonchi or rales.   Abdominal:      General: Bowel sounds are normal. There is no distension.      Palpations: Abdomen is soft.      Tenderness: There is no abdominal tenderness. There is no right CVA tenderness, left CVA tenderness, guarding or rebound.   Musculoskeletal:         General: No tenderness or deformity. Normal range of motion.      Cervical back: Normal range of motion and neck supple.      Right lower leg: No edema.      Left lower leg: No edema.   Lymphadenopathy:      Cervical: No cervical adenopathy.   Skin:     General: Skin is warm and dry.      Capillary Refill: Capillary refill takes less than 2 seconds.      Findings: No rash.   Neurological:      General: No focal deficit present.      Mental Status: She is alert and oriented to person, place, and time.      Motor: No abnormal muscle tone.      Deep Tendon Reflexes: " "Reflexes normal.   Psychiatric:         Behavior: Behavior normal.           Procedures      Labs  No results found for: \"WBC\", \"HGB\", \"HCT\", \"PLT\", \"CHOL\", \"TRIG\", \"HDL\", \"LDLCALC\", \"ALT\", \"AST\", \"NA\", \"K\", \"GLUCOSE\", \"CL\", \"CREATININE\", \"BUN\", \"CO2\", \"TSH\", \"T4F\", \"HGBA1C\", \"MCRALBCREAT\", \"EGFR\", \"PSA\"    Assessment/Plan     Diagnoses and all orders for this visit:    Routine adult health maintenance (Primary)  Assessment & Plan:  Recommend HPV#3, she will consider.  Has GYN appointment scheduled, Pap smear due.  She does not desire contraceptive methods at this time.  Routine HCV, HIV screenings ordered.  Encouraged to schedule for routine dental and dermatology exams    Orders:  -     Comprehensive metabolic panel; Future  -     CBC and Differential; Future    Screening for HIV (human immunodeficiency virus)  -     HIV 1,2 AB P24 AG; Future    Need for hepatitis C screening test  -     Hepatitis C antibody; Future    Other headache syndrome  Assessment & Plan:  Overall stable.  1-2 episodes per year, managed with ibuprofen    Orders:  -     Comprehensive metabolic panel; Future  -     CBC and Differential; Future  -     TSH w reflex FT4; Future        The patient, Lakshmi Melendez, verbalizes understanding and agreement with the above plan as well as reasons for seeking more immediate follow-up care.     Patient was instructed to call the office for any new signs or symptoms not discussed during today's office visit.    Speech recognition software was used to create portions of this note. Please excuse any inadvertent translation errors. Please contact the office for any clarifications or questions.     Return in about 1 year (around 3/24/2026) for Annual Physical.    Tree Enamorado MD    "

## 2025-03-24 NOTE — ASSESSMENT & PLAN NOTE
Recommend HPV#3, she will consider.  Has GYN appointment scheduled, Pap smear due.  She does not desire contraceptive methods at this time.  Routine HCV, HIV screenings ordered.  Encouraged to schedule for routine dental and dermatology exams

## 2025-08-28 ENCOUNTER — OFFICE VISIT (OUTPATIENT)
Dept: OBSTETRICS AND GYNECOLOGY | Facility: CLINIC | Age: 30
End: 2025-08-28
Payer: COMMERCIAL

## 2025-08-28 VITALS — WEIGHT: 109.4 LBS | DIASTOLIC BLOOD PRESSURE: 60 MMHG | BODY MASS INDEX: 18.49 KG/M2 | SYSTOLIC BLOOD PRESSURE: 108 MMHG

## 2025-08-28 DIAGNOSIS — Z12.4 SCREENING FOR CERVICAL CANCER: ICD-10-CM

## 2025-08-28 DIAGNOSIS — Z01.419 ENCOUNTER FOR ANNUAL ROUTINE GYNECOLOGICAL EXAMINATION: Primary | ICD-10-CM

## 2025-08-28 PROCEDURE — S0610 ANNUAL GYNECOLOGICAL EXAMINA: HCPCS | Performed by: STUDENT IN AN ORGANIZED HEALTH CARE EDUCATION/TRAINING PROGRAM

## 2025-08-28 PROCEDURE — 994XX PELVIC EXAM - NON-BILLABLE: CPT | Performed by: STUDENT IN AN ORGANIZED HEALTH CARE EDUCATION/TRAINING PROGRAM

## 2025-08-28 PROCEDURE — 3008F BODY MASS INDEX DOCD: CPT | Performed by: STUDENT IN AN ORGANIZED HEALTH CARE EDUCATION/TRAINING PROGRAM

## 2025-09-02 LAB
CYTOLOGIST CVX/VAG CYTO: NORMAL
CYTOLOGY CVX/VAG DOC CYTO: NORMAL
CYTOLOGY CVX/VAG DOC THIN PREP: NORMAL
DX ICD CODE: NORMAL
HPV I/H RISK 4 DNA CVX QL PROBE+SIG AMP: NEGATIVE
LC HPV GENOTYPE REFLEX: NORMAL
OTHER STN SPEC: NORMAL
SERVICE CMNT-IMP: NORMAL
STAT OF ADQ CVX/VAG CYTO-IMP: NORMAL